# Patient Record
Sex: MALE | Employment: STUDENT | URBAN - METROPOLITAN AREA
[De-identification: names, ages, dates, MRNs, and addresses within clinical notes are randomized per-mention and may not be internally consistent; named-entity substitution may affect disease eponyms.]

---

## 2022-08-10 DIAGNOSIS — Z02.5 SPORTS PHYSICAL: Primary | ICD-10-CM

## 2022-08-10 PROCEDURE — 93000 ELECTROCARDIOGRAM COMPLETE: CPT | Performed by: INTERNAL MEDICINE

## 2024-02-27 ENCOUNTER — HOSPITAL ENCOUNTER (OUTPATIENT)
Dept: PHYSICAL THERAPY | Age: 24
Setting detail: THERAPIES SERIES
Discharge: HOME | End: 2024-02-27
Payer: COMMERCIAL

## 2024-02-27 PROCEDURE — 97112 NEUROMUSCULAR REEDUCATION: CPT

## 2024-02-27 PROCEDURE — 97161 PT EVAL LOW COMPLEX 20 MIN: CPT

## 2024-02-27 PROCEDURE — 97140 MANUAL THERAPY 1/> REGIONS: CPT

## 2024-02-27 NOTE — FLOWSHEET NOTE
[facility] - [facility] and [facility] Rehabilitation[address] [address]  Phone: [phone] Fax: [phone]    Physical Therapy Treatment Note/ Progress Report:       Date:  2024    Patient Name:  Rolando Torres    :  2000  MRN: [id number]  Restrictions/Precautions:    Medical/Treatment Diagnosis Information:  Diagnosis: concussion S06.0X0A, neck pain M54.2  Treatment Diagnosis: concussion S06.0X0A, neck pain M54.2  Insurance/Certification information:  PT Insurance Information: Aetna/AG/[place]  Physician Information:  Lewis Skinner, Jalil  Plan of care signed (Y/N):     Date of Patient follow up with Physician:      Progress Report: [x]  Yes  []  No     Date Range for reporting period:  Beginnin2024  Ending:     Progress report due (10 Rx/or 30 days whichever is less): 3/27/2024     Recertification due (POC duration/ or 90 days whichever is less): 3/27/2024     Visit # Insurance Allowable Auth Needed   1 Aetna/AG/[place] []Yes    []No     Pain level:  4-7/10     SUBJECTIVE:  See eval    OBJECTIVE: See eval  Observation:   Test measurements:      RESTRICTIONS/PRECAUTIONS: n/a    INTERVENTIONS:      Therapeutic Ex Sets/sec Reps Notes   UBE      T- band Row/pinch      T- band lower pinch      T- band ER activation      UT stretch      Levator stretch      Isometric at wall      Quadruped w cerv retract      Front plank      Side plank      Chin tuck      Chin tuck w lift      Chin tuck w rotation                  Manual Intervention 22      Cerv mobs/manip  5    Thoracic mobs/manip  3    CT manip  3    Rib mobilizations  3 R 1st rib   STM  8    DN            NMR re-education 10      JANEL assessment  5    Oculomotor assessment pre-post treatment, with education for home program to include smooth pursuits and gaze stabs  5                                Therapeutic Exercise and NMR EXR  [x] (79590) Provided verbal/tactile cueing for activities related to

## 2024-02-27 NOTE — PLAN OF CARE
[facility] Rehabilitation[place] [address] [place] [address]  Phone: [phone]  Fax:     [phone]         Physical Therapy Certification    Dear Lewis Skinner, *,    We had the pleasure of evaluating the following patient for physical therapy services at [facility].  A summary of our findings can be found in the initial assessment below.  This includes our plan of care.  If you have any questions or concerns regarding these findings, please do not hesitate to contact me at the office phone number checked above.  Thank you for the referral.       Physician Signature:_______________________________Date:__________________  By signing above (or electronic signature), therapist’s plan is approved by physician            Patient: Rolando Torres \"WILL\"  : 2000   MRN: [id number]  Referring Physician: Lewis Skinner, *      Evaluation Date: 2024      Medical Diagnosis Information:  Diagnosis: concussion S06.0X0A, neck pain M54.2   Treatment Diagnosis: concussion S06.0X0A, neck pain M54.2                                         Insurance information: PT Insurance Information: Aetna/AG/[place]    Precautions/ Contra-indications: n/a  Latex Allergy:  [x]NO      []YES  Preferred Language for Healthcare:   [x]English       []other:    C-SSRS Triggered by Intake questionnaire (Past 2 wk assessment ):   [x] No, Questionnaire did not trigger screening.   [] Yes, Patient intake triggered C-SSRS Screening      [] C-SSRS Screening completed  [] PCP notified via Epic     SUBJECTIVE: Patient stated complaint:Patient reports that about 5 weeks ago during a game he was skating up ice and lost his edge of the skate and the opposing player hit him in the head with his knees. Head did not contact ice. He ended up playing for additional 20 minutes but then couldn't play any more. Had been working

## 2024-02-29 ENCOUNTER — HOSPITAL ENCOUNTER (OUTPATIENT)
Dept: PHYSICAL THERAPY | Age: 24
Setting detail: THERAPIES SERIES
Discharge: HOME | End: 2024-02-29
Payer: COMMERCIAL

## 2024-02-29 PROCEDURE — 97032 APPL MODALITY 1+ESTIM EA 15: CPT

## 2024-02-29 PROCEDURE — 20560 NDL INSJ W/O NJX 1 OR 2 MUSC: CPT

## 2024-02-29 PROCEDURE — 97112 NEUROMUSCULAR REEDUCATION: CPT

## 2024-02-29 PROCEDURE — 97140 MANUAL THERAPY 1/> REGIONS: CPT

## 2024-02-29 NOTE — FLOWSHEET NOTE
[facility] and Sports Rehabilitation[place] [address] [place] [address]  Phone: [phone] Fax: [phone]    Physical Therapy Treatment Note/ Progress Report:       Date:  2024    Patient Name:  Rolando Torres   \"WILL\"  :  2000  MRN: [id number]  Restrictions/Precautions:    Medical/Treatment Diagnosis Information:  Diagnosis: concussion S06.0X0A, neck pain M54.2  Treatment Diagnosis: concussion S06.0X0A, neck pain M54.2  Insurance/Certification information:  PT Insurance Information: Aetna/[place]  Physician Information:  Lewis Skinner, *  Plan of care signed (Y/N):     Date of Patient follow up with Physician:      Progress Report: [x]  Yes  []  No     Date Range for reporting period:  Beginnin2024  Ending:     Progress report due (10 Rx/or 30 days whichever is less): 3/27/2024     Recertification due (POC duration/ or 90 days whichever is less): 3/27/2024     Visit # Insurance Allowable Auth Needed   2 Aetna/AG/[place] []Yes    []No     Pain level:  2-3/10     SUBJECTIVE:  States that he had the best day that he has in a few weeks after last session. HA has decreased. Able to study/concentrate for 2 hours (was about 40-50 min before). Agreeable to DN.      OBJECTIVE: See eval  Observation:   Test measurements:      RESTRICTIONS/PRECAUTIONS: n/a    INTERVENTIONS:      Therapeutic Ex 5 Sets/sec Reps Notes   UBE      T- band Row/pinch      T- band lower pinch      T- band ER activation      UT stretch      Levator stretch      Isometric at wall      Quadruped w cerv retract      Front plank      Side plank      Chin tuck      Chin tuck w lift      Chin tuck w rotation      No money 2 10 green   T spine extension over roll 10 10    Manual Intervention 26      Cerv mobs/manip  6    Thoracic mobs/manip  3    CT manip- seated and prone  6    Rib mobilizations  3 R 1st rib   STM- UTs  8                NMR re-education 15      JANEL assessment  0

## 2024-03-01 ENCOUNTER — HOSPITAL ENCOUNTER (OUTPATIENT)
Dept: PHYSICAL THERAPY | Age: 24
Setting detail: THERAPIES SERIES
Discharge: HOME | End: 2024-03-01
Payer: COMMERCIAL

## 2024-03-01 PROCEDURE — 97140 MANUAL THERAPY 1/> REGIONS: CPT

## 2024-03-01 PROCEDURE — 97110 THERAPEUTIC EXERCISES: CPT

## 2024-03-01 PROCEDURE — 97112 NEUROMUSCULAR REEDUCATION: CPT

## 2024-03-01 NOTE — FLOWSHEET NOTE
allow for proper joint functioning as indicated by patients Functional Deficits.   [] Progressing: [] Met: [] Not Met: [] Adjusted  3. Patient will demonstrate an increase in postural awareness and control and activation of  Deep cervical stabilizers to allow for proper functional mobility as indicated by patients Functional Deficits.   [] Progressing: [] Met: [] Not Met: [] Adjusted  4. Patient will return to working out and daily functional activities without increased symptoms or restriction.   [] Progressing: [] Met: [] Not Met: [] Adjusted  5. Patient to have normalized concussion symptoms to facilitate participation in movement, function, ADLs, as well as to allow patient to resume all school work without limitation.   [] Progressing: [] Met: [] Not Met: [] Adjusted   6. Patient will be able to skate at least 1 hour at practice without increase in symptoms. (patient specific functional goal)    [] Progressing: [] Met: [] Not Met: [] Adjusted    ASSESSMENT:  Mild increase in symptoms of HA today upon arrival; good tolerance to manipulation and soft tissue work with reports of decreased HA. Added further oculomotor and periscapular/cervical strengthening. Patient with no HA at conclusion.     Treatment/Activity Tolerance:  [x] Patient tolerated treatment well [] Patient limited by fatique  [] Patient limited by pain  [] Patient limited by other medical complications  [] Other:     Overall Progression Towards Functional goals/ Treatment Progress Update:  [] Patient is progressing as expected towards functional goals listed.    [] Progression is slowed due to complexities/Impairments listed.  [] Progression has been slowed due to co-morbidities.  [x] Plan just implemented, too soon to assess goals progression <30days   [] Goals require adjustment due to lack of progress  [] Patient is not progressing as expected and requires additional follow up with physician  [] Other    Prognosis for POC: [x] Good [] Fair  []

## 2024-03-04 ENCOUNTER — HOSPITAL ENCOUNTER (OUTPATIENT)
Dept: PHYSICAL THERAPY | Age: 24
Setting detail: THERAPIES SERIES
Discharge: HOME | End: 2024-03-04
Payer: COMMERCIAL

## 2024-03-04 PROCEDURE — 97140 MANUAL THERAPY 1/> REGIONS: CPT

## 2024-03-04 PROCEDURE — 97110 THERAPEUTIC EXERCISES: CPT

## 2024-03-04 NOTE — FLOWSHEET NOTE
[facility] - Orthopaedics and Sports Rehabilitation[address] [address]  Phone: [phone] Fax: [phone]    Physical Therapy Treatment Note/ Progress Report:       Date:  2024    Patient Name:  Rolando Torres   \"WILL\"  :  2000  MRN: [id number]  Restrictions/Precautions:    Medical/Treatment Diagnosis Information:  Diagnosis: concussion S06.0X0A, neck pain M54.2  Treatment Diagnosis: concussion S06.0X0A, neck pain M54.2  Insurance/Certification information:  PT Insurance Information: Aetna/[place]  Physician Information:  Lewis Skinner, *  Plan of care signed (Y/N):     Date of Patient follow up with Physician:      Progress Report: [x]  Yes  []  No     Date Range for reporting period:  Beginnin2024  Ending:     Progress report due (10 Rx/or 30 days whichever is less): 3/27/2024     Recertification due (POC duration/ or 90 days whichever is less): 3/27/2024     Visit # Insurance Allowable Auth Needed   4 Aetna/AG/[place] []Yes    []No     Pain level:  3-4/10     SUBJECTIVE: Patient reports that he felt good after last session on Friday. Went to game on Friday and Saturday. States that he had some mild symptoms after Friday game and then was ok at Saturday game but had to look to L for extended period of time. States that  he felt way worse and had increased HA on L (normally R), increased light sensitivity and mild dizziness.  Dr Ham did look at small cyst/lipoma area and rotation of C7/T1 and believes that is just his normal- not doing any further imaging at this time.      OBJECTIVE: c/spine flexion 65, extension 75  Observation: mild rotation to L of C7/T1, small cyst/lipoma type nodule at this level on L  Increased tightness bilaterally in c2 and c3 today  Test measurements:      RESTRICTIONS/PRECAUTIONS: n/a    INTERVENTIONS:      Therapeutic Ex 10 Sets/sec Reps Notes   UBE      Quadruped w cerv retract         Iron neck with pull

## 2024-03-05 ENCOUNTER — HOSPITAL ENCOUNTER (OUTPATIENT)
Dept: PHYSICAL THERAPY | Age: 24
Setting detail: THERAPIES SERIES
Discharge: HOME | End: 2024-03-05
Payer: COMMERCIAL

## 2024-03-05 PROCEDURE — 97110 THERAPEUTIC EXERCISES: CPT

## 2024-03-05 PROCEDURE — 97140 MANUAL THERAPY 1/> REGIONS: CPT

## 2024-03-05 NOTE — FLOWSHEET NOTE
[facility] - Orthopaedics and Sports Rehabilitation[address] [address]  Phone: [phone] Fax: [phone]    Physical Therapy Treatment Note/ Progress Report:       Date:  2024    Patient Name:  Rolando Torres   \"WILL\"  :  2000  MRN: [id number]  Restrictions/Precautions:    Medical/Treatment Diagnosis Information:  Diagnosis: concussion S06.0X0A, neck pain M54.2  Treatment Diagnosis: concussion S06.0X0A, neck pain M54.2  Insurance/Certification information:  PT Insurance Information: Aetna/[place]  Physician Information:  Lewis Skinner, *  Plan of care signed (Y/N):     Date of Patient follow up with Physician:      Progress Report: [x]  Yes  []  No     Date Range for reporting period:  Beginnin2024  Ending:     Progress report due (10 Rx/or 30 days whichever is less): 3/27/2024     Recertification due (POC duration/ or 90 days whichever is less): 3/27/2024     Visit # Insurance Allowable Auth Needed   5 Aetna/AG/[place] []Yes    []No     Pain level:  2/10     SUBJECTIVE: Patient reports that he has a slight HA today, better than yesterday. Is still feeling like eyes are disconnected and more foggy/dizzy. Saw Dr Ham and considering him to start medication for HA. Also discussing going to neuro-ophthalmologist for his eyes.        OBJECTIVE: c/spine flexion 65, extension 75  Observation: mild rotation to L of C7/T1, small cyst/lipoma type nodule at this level on L  Increased tightness bilaterally in c2 and c3 today  Test measurements:      RESTRICTIONS/PRECAUTIONS: n/a    INTERVENTIONS:      Therapeutic Ex 15 Sets/sec Reps Notes   UBE      Quadruped w cerv retract      Iron neck step outs in all directions 5sec 10    Iron neck with pull down 2 10 green   green   green      Cervical retraction + no money 2 10    green   T spine extension over roll 10 10    Manual Intervention 29      Cerv mobs/manip  6 Multi level   Thoracic mobs/manip  3    CT

## 2024-03-06 ENCOUNTER — HOSPITAL ENCOUNTER (OUTPATIENT)
Dept: PHYSICAL THERAPY | Age: 24
Setting detail: THERAPIES SERIES
Discharge: HOME | End: 2024-03-06
Payer: COMMERCIAL

## 2024-03-06 PROCEDURE — 97140 MANUAL THERAPY 1/> REGIONS: CPT

## 2024-03-06 PROCEDURE — 20560 NDL INSJ W/O NJX 1 OR 2 MUSC: CPT

## 2024-03-06 PROCEDURE — 97110 THERAPEUTIC EXERCISES: CPT

## 2024-03-06 PROCEDURE — 97032 APPL MODALITY 1+ESTIM EA 15: CPT

## 2024-03-06 NOTE — FLOWSHEET NOTE
[facility] - Orthopaedics and Sports Rehabilitation[address] [address]  Phone: [phone] Fax: [phone]    Physical Therapy Treatment Note/ Progress Report:       Date:  2024    Patient Name:  Rolando Torres   \"WILL\"  :  2000  MRN: [id number]  Restrictions/Precautions:    Medical/Treatment Diagnosis Information:  Diagnosis: concussion S06.0X0A, neck pain M54.2  Treatment Diagnosis: concussion S06.0X0A, neck pain M54.2  Insurance/Certification information:  PT Insurance Information: Aetna/[place]  Physician Information:  Lewis Skinner, *  Plan of care signed (Y/N):     Date of Patient follow up with Physician:      Progress Report: [x]  Yes  []  No     Date Range for reporting period:  Beginnin2024  Ending:     Progress report due (10 Rx/or 30 days whichever is less): 3/27/2024     Recertification due (POC duration/ or 90 days whichever is less): 3/27/2024     Visit # Insurance Allowable Auth Needed   6 Aetna/AG/[place] []Yes    []No     Pain level:  2/10     SUBJECTIVE: Patient reports that he has a slight HA today, better than yesterday. Less foggy/dizzy today compared to yesterday. Slowed down his oculomotor exercises with improved tolerance today.         OBJECTIVE: c/spine flexion 65, extension 75  Observation: mild rotation to L of C7/T1, small cyst/lipoma type nodule at this level on L  Increased tightness bilaterally in c2 and c3 today  Test measurements:      RESTRICTIONS/PRECAUTIONS: n/a    INTERVENTIONS:      Therapeutic Ex 20 Sets/sec Reps Notes   UBE      Quadruped w cerv retract      Iron neck step outs in all directions 5sec 10    Iron neck with pull down 2 10 green   Foam roll up wall with scap activation 1 10 green   Lateral wall taps 2 10 green      Cervical retraction + no money 2 10    No money 2 10 green   T spine extension over roll 10 10    Manual Intervention 29      Cerv mobs/manip  6 Multi level   Thoracic mobs/manip

## 2024-03-08 ENCOUNTER — HOSPITAL ENCOUNTER (OUTPATIENT)
Dept: PHYSICAL THERAPY | Age: 24
Setting detail: THERAPIES SERIES
Discharge: HOME | End: 2024-03-08
Payer: COMMERCIAL

## 2024-03-08 PROCEDURE — 97140 MANUAL THERAPY 1/> REGIONS: CPT

## 2024-03-08 NOTE — FLOWSHEET NOTE
[facility] - [facility] Rehabilitation[place] [facility] [address] [place] [address]  Phone: [phone] Fax: [phone]    Physical Therapy Treatment Note/ Progress Report:       Date:  2024    Patient Name:  Rolando Torres   \"WILL\"  :  2000  MRN: [id number]  Restrictions/Precautions:    Medical/Treatment Diagnosis Information:  Diagnosis: concussion S06.0X0A, neck pain M54.2  Treatment Diagnosis: concussion S06.0X0A, neck pain M54.2  Insurance/Certification information:  PT Insurance Information: Aetna/[place]  Physician Information:  Lewis Skinner, *  Plan of care signed (Y/N):     Date of Patient follow up with Physician:      Progress Report: [x]  Yes  []  No     Date Range for reporting period:  Beginnin2024  Ending:     Progress report due (10 Rx/or 30 days whichever is less): 3/27/2024     Recertification due (POC duration/ or 90 days whichever is less): 3/27/2024     Visit # Insurance Allowable Auth Needed   7 Aetna/AG/[place] []Yes    []No     Pain level:  2/10     SUBJECTIVE: Walking to class seems to be most aggravating. Did start meds and it seems to be helping a little. Still R sided HA, but moves.     OBJECTIVE: c/spine flexion 65, extension 75  Observation: mild rotation to L of C7/T1, small cyst/lipoma type nodule at this level on L  Increased tightness bilaterally in c2 and c3 today  Test measurements:      RESTRICTIONS/PRECAUTIONS: n/a    INTERVENTIONS:      Therapeutic Ex hold Sets/sec Reps Notes   UBE      Quadruped w cerv retract      Iron neck step outs in all directions 5sec 10    Iron neck with pull down 2 10 green   Foam roll up wall with scap activation 1 10 green   Lateral wall taps 2 10 green      Cervical retraction + no money 2 10    No money 2 10 green   T spine extension over roll 10 10    Manual Intervention 32      Cerv mobs/manip  6 Multi level   Thoracic mobs/manip  2    CT manip- seated and prone  3    Rib mobilizations  3

## 2024-03-11 ENCOUNTER — HOSPITAL ENCOUNTER (OUTPATIENT)
Dept: PHYSICAL THERAPY | Age: 24
Setting detail: THERAPIES SERIES
Discharge: HOME | End: 2024-03-11
Payer: COMMERCIAL

## 2024-03-11 PROCEDURE — 97110 THERAPEUTIC EXERCISES: CPT

## 2024-03-11 PROCEDURE — 97140 MANUAL THERAPY 1/> REGIONS: CPT

## 2024-03-11 NOTE — FLOWSHEET NOTE
Provided manual therapy to mobilize soft tissue/joints of cervical/CT, scapular GHJ and UE for the purpose of decreasing headache, modulating pain, promoting relaxation,  increasing ROM, reducing/eliminating soft tissue swelling/inflammation/restriction, improving soft tissue extensibility and allowing for proper ROM for normal function with self care, reaching, carrying, lifting, house/yardwork, driving/computer work    Modalities:      Charges:  Timed Code Treatment Minutes: 45   Total Treatment Minutes: 45     [] EVAL (LOW) 48009 (typically 20 minutes face-to-face)  [] EVAL (MOD) 63945 (typically 30 minutes face-to-face)  [] EVAL (HIGH) 98612 (typically 45 minutes face-to-face)  [] RE-EVAL     [x] TE(36862) x  1   [] Dry needle 1 or 2 Muscles (20561)  [] NMR (70024) x     [] Dry needle 3+ Muscles (20562)  [x] Manual (90619) x  2    [] TA (08346) x     [] Mech Traction (62906)  [] ES(attended) (40437)     [] ES (un) (92980):   [] VASO (03931)  [] Other    GOALS:  Patient stated goal: relieve tension in neck  [] Progressing: [] Met: [] Not Met: [] Adjusted    Therapist goals for Patient:   Short Term Goals: To be achieved in: 2 weeks  1. Independent in HEP and progression per patient tolerance, in order to prevent re-injury.   [] Progressing: [] Met: [] Not Met: [] Adjusted  2. Patient will have a decrease in pain and concussion symptoms to facilitate improvement in movement, function, and ADLs as indicated by Functional Deficits.  [] Progressing: [] Met: [] Not Met: [] Adjusted    Long Term Goals: To be achieved in: 4 weeks  1. Disability index score of 5% or less for the NDI and less than 10% for DHI to assist with reaching prior level of function.   [] Progressing: [] Met: [] Not Met: [] Adjusted  2. Patient will demonstrate increased AROM to WFL of cervical/thoracic spine to allow for proper joint functioning as indicated by patients Functional Deficits.   [] Progressing: [] Met: [] Not Met: [] Adjusted  3.

## 2024-03-13 ENCOUNTER — HOSPITAL ENCOUNTER (OUTPATIENT)
Dept: PHYSICAL THERAPY | Age: 24
Setting detail: THERAPIES SERIES
Discharge: HOME | End: 2024-03-13
Payer: COMMERCIAL

## 2024-03-13 PROCEDURE — 97110 THERAPEUTIC EXERCISES: CPT

## 2024-03-13 PROCEDURE — 97140 MANUAL THERAPY 1/> REGIONS: CPT

## 2024-03-13 NOTE — FLOWSHEET NOTE
progressing as expected towards functional goals listed.    [] Progression is slowed due to complexities/Impairments listed.  [] Progression has been slowed due to co-morbidities.  [x] Plan just implemented, too soon to assess goals progression <30days   [] Goals require adjustment due to lack of progress  [] Patient is not progressing as expected and requires additional follow up with physician  [] Other    Prognosis for POC: [x] Good [] Fair  [] Poor    Patient requires continued skilled intervention: [x] Yes  [] No        PLAN: Continue to address oculomotor and cervical symptoms  [x] Continue per plan of care [] Alter current plan (see comments)  [] Plan of care initiated [] Hold pending MD visit [] Discharge    Electronically signed by: Meenu Maynard, PT    Note: If patient does not return for scheduled/recommended follow up visits, this note will serve as a discharge from care along with the most recent update on progress.

## 2024-03-15 ENCOUNTER — APPOINTMENT (OUTPATIENT)
Dept: PHYSICAL THERAPY | Age: 24
End: 2024-03-15
Payer: COMMERCIAL

## 2024-03-19 ENCOUNTER — HOSPITAL ENCOUNTER (OUTPATIENT)
Dept: PHYSICAL THERAPY | Age: 24
Setting detail: THERAPIES SERIES
Discharge: HOME | End: 2024-03-19
Payer: COMMERCIAL

## 2024-03-19 PROCEDURE — 97110 THERAPEUTIC EXERCISES: CPT

## 2024-03-19 PROCEDURE — 97140 MANUAL THERAPY 1/> REGIONS: CPT

## 2024-03-19 NOTE — PLAN OF CARE
to prevent upper trap compensation and excessive forward shoulders or over compensation from c/spine or l/spine due to decrease in thoracic extension. Still struggling with maintaining focus/headaches while in class but overall has improved. Needs to further work on oculomotor exercises, with his new glasses- which will begin at next session. Will continue to address noted deficits 2-3x week to further improve. Additionally, patient to begin returning to cardio work- biking, elliptical or skating. Discussed this expected progression with patient.            Treatment/Activity Tolerance:  [x] Patient tolerated treatment well [] Patient limited by fatique  [] Patient limited by pain  [] Patient limited by other medical complications  [] Other:     Overall Progression Towards Functional goals/ Treatment Progress Update:  [x] Patient is progressing as expected towards functional goals listed.    [] Progression is slowed due to complexities/Impairments listed.  [] Progression has been slowed due to co-morbidities.  [] Plan just implemented, too soon to assess goals progression <30days   [] Goals require adjustment due to lack of progress  [] Patient is not progressing as expected and requires additional follow up with physician  [] Other    Prognosis for POC: [x] Good [] Fair  [] Poor    Patient requires continued skilled intervention: [x] Yes  [] No        PLAN: Continue to address oculomotor and cervical symptoms  [x] Continue per plan of care [] Alter current plan (see comments)  [] Plan of care initiated [] Hold pending MD visit [] Discharge    Electronically signed by: Maria Eugenia Sher PT    Note: If patient does not return for scheduled/recommended follow up visits, this note will serve as a discharge from care along with the most recent update on progress.

## 2024-03-20 ENCOUNTER — HOSPITAL ENCOUNTER (OUTPATIENT)
Dept: PHYSICAL THERAPY | Age: 24
Setting detail: THERAPIES SERIES
Discharge: HOME | End: 2024-03-20
Payer: COMMERCIAL

## 2024-03-20 PROCEDURE — 97140 MANUAL THERAPY 1/> REGIONS: CPT

## 2024-03-20 PROCEDURE — 97112 NEUROMUSCULAR REEDUCATION: CPT

## 2024-03-20 PROCEDURE — 97032 APPL MODALITY 1+ESTIM EA 15: CPT

## 2024-03-20 PROCEDURE — 20560 NDL INSJ W/O NJX 1 OR 2 MUSC: CPT

## 2024-03-20 NOTE — FLOWSHEET NOTE
[facility] - Orthopaedics and Sports Rehabilitation[address] [address]  Phone: [phone] Fax: [phone]        Physical Therapy Treatment Note/ Progress Report:       Date:  2024    Patient Name:  Rolando Torres   \"WILL\"  :  2000  MRN: [id number]  Restrictions/Precautions:    Medical/Treatment Diagnosis Information:  Diagnosis: concussion S06.0X0A, neck pain M54.2  Treatment Diagnosis: concussion S06.0X0A, neck pain M54.2  Insurance/Certification information:  PT Insurance Information: Aetna/[place]  Physician Information:  Lewis Skinner, *  Plan of care signed (Y/N):     Date of Patient follow up with Physician:      Progress Report: [x]  Yes  []  No     Date Range for reporting period:  Beginnin2024  Ending: 3/19/2024    Progress report due (10 Rx/or 30 days whichever is less): 3/27/2024     Recertification due (POC duration/ or 90 days whichever is less): 3/27/2024     Visit # Insurance Allowable Auth Needed   11 Aetna/AG/[place] []Yes    []No     Pain level:  3-4/10     SUBJECTIVE: Pt states his neck has been feeling a bit sore. Would like to DN again today. Glasses have been helping some. Did biking for about 5 min today and this increased HA some.     OBJECTIVE:   Observation: mild rotation to L of C7/T1, small cyst/lipoma type nodule at this level on L  Increased tightness bilaterally in c2 and c3 today  Test measurements:      DHI 10%  NDI 8%    CERV ROM     Cervical Flexion 70   Cervical Extension 75   Cervical SB L 50; R 45    Cervical rotation L 87 ; R 87     VOMS: Slowed horizontal saccades, everything else WNL    RESTRICTIONS/PRECAUTIONS: n/a    INTERVENTIONS:      Therapeutic Ex 0 Sets/sec Reps Notes   UBE      Quadruped w cerv retract 5 sec 10 red   Iron neck retro step with turning- bilateral turns 5sec 10    Iron neck with RIP stick 1 10    2 10 green   Foam roll up wall with scap activation 1 10 green   Lateral wall taps 2 10

## 2024-03-22 ENCOUNTER — HOSPITAL ENCOUNTER (OUTPATIENT)
Dept: PHYSICAL THERAPY | Age: 24
Setting detail: THERAPIES SERIES
Discharge: HOME | End: 2024-03-22
Payer: COMMERCIAL

## 2024-03-22 PROCEDURE — 97110 THERAPEUTIC EXERCISES: CPT

## 2024-03-22 PROCEDURE — 97140 MANUAL THERAPY 1/> REGIONS: CPT

## 2024-03-22 NOTE — FLOWSHEET NOTE
[facility] - [facility]opaedics and Sports Rehabilitation[address] [address]  Phone: [phone] Fax: [phone]        Physical Therapy Treatment Note/ Progress Report:       Date:  2024    Patient Name:  Rolando Torres   \"WILL\"  :  2000  MRN: [id number]  Restrictions/Precautions:    Medical/Treatment Diagnosis Information:  Diagnosis: concussion S06.0X0A, neck pain M54.2  Treatment Diagnosis: concussion S06.0X0A, neck pain M54.2  Insurance/Certification information:  PT Insurance Information: Aetna/AG/[place]  Physician Information:  Lewis Skinner, *  Plan of care signed (Y/N):     Date of Patient follow up with Physician:      Progress Report: [x]  Yes  []  No     Date Range for reporting period:  Beginnin2024  Ending: 3/19/2024    Progress report due (10 Rx/or 30 days whichever is less): 3/27/2024     Recertification due (POC duration/ or 90 days whichever is less): 3/27/2024     Visit # Insurance Allowable Auth Needed   12 Aetna/AG/[place] []Yes    []No     Pain level:  1-2/10     SUBJECTIVE: Pt states his neck was sore after last session; but feeling better after DN. Has minimal HA today. No issues with oculomotor exercises after last session.        OBJECTIVE:   Observation: mild rotation to L of C7/T1, small cyst/lipoma type nodule at this level on L  Increased tightness bilaterally in c2 and c3 today  Test measurements:      DHI 10%  NDI 8%    CERV ROM     Cervical Flexion 70   Cervical Extension 75   Cervical SB L 50; R 45    Cervical rotation L 87 ; R 87     VOMS: Slowed horizontal saccades, everything else WNL    RESTRICTIONS/PRECAUTIONS: n/a    INTERVENTIONS:      Therapeutic Ex 20 Sets/sec Reps Notes   UBE      Quadruped w cerv retract 5 sec 10 red   Iron neck retro step with turning- bilateral turns 5sec 10    Iron neck with RIP stick 1 10    2 10 green   Foam roll up wall with scap activation 1 10 green   Lateral wall taps 2 10 green

## 2024-04-02 ENCOUNTER — HOSPITAL ENCOUNTER (OUTPATIENT)
Dept: PHYSICAL THERAPY | Age: 24
Setting detail: THERAPIES SERIES
Discharge: HOME | End: 2024-04-02
Payer: COMMERCIAL

## 2024-04-02 PROCEDURE — 97110 THERAPEUTIC EXERCISES: CPT

## 2024-04-02 PROCEDURE — 97140 MANUAL THERAPY 1/> REGIONS: CPT

## 2024-04-02 NOTE — FLOWSHEET NOTE
[facility] - Orthopaedics and Sports Rehabilitation[address] [address]  Phone: [phone] Fax: [phone]        Physical Therapy Treatment Note/ Progress Report:       Date:  2024    Patient Name:  Rolando Torres   \"WILL\"  :  2000  MRN: [id number]  Restrictions/Precautions:    Medical/Treatment Diagnosis Information:  Diagnosis: concussion S06.0X0A, neck pain M54.2  Treatment Diagnosis: concussion S06.0X0A, neck pain M54.2  Insurance/Certification information:  PT Insurance Information: Aetna/[place]  Physician Information:  Lewis Skinner, [place]  Plan of care signed (Y/N):     Date of Patient follow up with Physician:      Progress Report: [x]  Yes  []  No     Date Range for reporting period:  Beginnin2024  Ending: 3/19/2024    Progress report due (10 Rx/or 30 days whichever is less): 3/27/2024     Recertification due (POC duration/ or 90 days whichever is less): 3/27/2024     Visit # Insurance Allowable Auth Needed   13 Aetna/AG/[place] []Yes    []No     Pain level:  3/10 (headache)     SUBJECTIVE: Pt reports currently having a headache on R side that feels like it is deep. Was sick all day  which he felt like may have contributed d/t increased pressure. He states his headaches were not too bad over spring break. He skated and worked on stick handling without much issue. Feels like he doesn't know what is causing his headaches. He went to [place] and walked around and went to hockey game without issue. Had a headache after studying for hours on . Is going to get an MRI tomorrow.        OBJECTIVE:   Observation: mild rotation to L of C7/T1, small cyst/lipoma type nodule at this level on L  Increased tightness bilaterally in c2 and c3 today  Test measurements:      DHI 10%  NDI 8%    CERV ROM     Cervical Flexion 70   Cervical Extension 75   Cervical SB L 50; R 45    Cervical rotation L 87 ; R 87     VOMS: Slowed horizontal saccades,

## 2024-04-03 ENCOUNTER — APPOINTMENT (OUTPATIENT)
Dept: PHYSICAL THERAPY | Age: 24
End: 2024-04-03
Payer: COMMERCIAL

## 2024-04-05 ENCOUNTER — HOSPITAL ENCOUNTER (OUTPATIENT)
Dept: PHYSICAL THERAPY | Age: 24
Setting detail: THERAPIES SERIES
Discharge: HOME | End: 2024-04-05
Payer: COMMERCIAL

## 2024-04-05 PROCEDURE — 97140 MANUAL THERAPY 1/> REGIONS: CPT

## 2024-04-05 PROCEDURE — 97110 THERAPEUTIC EXERCISES: CPT

## 2024-04-05 NOTE — FLOWSHEET NOTE
[facility] - Orthopaedics and Sports Rehabilitation[address] [address]  Phone: [phone] Fax: [phone]        Physical Therapy Treatment Note/ Progress Report:       Date:  2024    Patient Name:  Rolando Torres   \"WILL\"  :  2000  MRN: [id number]  Restrictions/Precautions:    Medical/Treatment Diagnosis Information:  Diagnosis: concussion S06.0X0A, neck pain M54.2  Treatment Diagnosis: concussion S06.0X0A, neck pain M54.2  Insurance/Certification information:  PT Insurance Information: Aetna/[place]  Physician Information:  Lewis Skinner, *  Plan of care signed (Y/N):     Date of Patient follow up with Physician:      Progress Report: [x]  Yes  []  No     Date Range for reporting period:  Beginnin2024  Ending: 3/19/2024    Progress report due (10 Rx/or 30 days whichever is less): 3/27/2024     Recertification due (POC duration/ or 90 days whichever is less): 3/27/2024     Visit # Insurance Allowable Auth Needed   14 Aetna/AG/[place] []Yes    []No     Pain level:  1-2/10 (headache)     SUBJECTIVE: Pt reports that his HA has been feeling better. Has a slight HA today. Notes that his neck gets stiff with doing a lot of studying. States that he was able to bike for 25 min today but last 2 mins started having a little bit of HA. Had some HA with listening to a lot of music.        OBJECTIVE:   Observation: mild rotation to L of C7/T1, small cyst/lipoma type nodule at this level on L  Increased tightness bilaterally in c2 and c3 today  Test measurements:      DHI 10%  NDI 8%    CERV ROM     Cervical Flexion 70   Cervical Extension 75   Cervical SB L 50; R 45    Cervical rotation L 87 ; R 87     VOMS: Slowed horizontal saccades, everything else WNL    RESTRICTIONS/PRECAUTIONS: n/a    INTERVENTIONS:      Therapeutic Ex 22 Sets/sec Reps Notes   UBE      Quadruped w cerv retract 5 sec 10 green   Iron neck retro step with turning- bilateral turns 5sec 10  Normal for race

## 2024-04-09 ENCOUNTER — HOSPITAL ENCOUNTER (OUTPATIENT)
Dept: PHYSICAL THERAPY | Age: 24
Setting detail: THERAPIES SERIES
Discharge: HOME | End: 2024-04-09
Payer: COMMERCIAL

## 2024-04-09 PROCEDURE — 97140 MANUAL THERAPY 1/> REGIONS: CPT

## 2024-04-09 PROCEDURE — 97110 THERAPEUTIC EXERCISES: CPT

## 2024-04-09 PROCEDURE — 97112 NEUROMUSCULAR REEDUCATION: CPT

## 2024-04-09 NOTE — FLOWSHEET NOTE
[x] Progressing: [] Met: [] Not Met: [] Adjusted    ASSESSMENT:  Increased tightness overall in bilat mid to low cervical spine.  Good improvement in overall joint mobility and with cavitation of c/spine. Focus on pain reduction, mobility and stretching today with good tolerance.  Patient with good reduction in HA at conclusion of session. Did well with progression of all exercises and oculomotor exercises today. Good overall tolerance to all exercises today.  Will continue to address noted deficits 2-3x week to further improve. Additionally, patient to begin returning to cardio work- biking, elliptical or skating.           Treatment/Activity Tolerance:  [x] Patient tolerated treatment well [] Patient limited by fatique  [] Patient limited by pain  [] Patient limited by other medical complications  [] Other:     Overall Progression Towards Functional goals/ Treatment Progress Update:  [x] Patient is progressing as expected towards functional goals listed.    [] Progression is slowed due to complexities/Impairments listed.  [] Progression has been slowed due to co-morbidities.  [] Plan just implemented, too soon to assess goals progression <30days   [] Goals require adjustment due to lack of progress  [] Patient is not progressing as expected and requires additional follow up with physician  [] Other    Prognosis for POC: [x] Good [] Fair  [] Poor    Patient requires continued skilled intervention: [x] Yes  [] No        PLAN: Continue to address oculomotor and cervical symptoms  [x] Continue per plan of care [] Alter current plan (see comments)  [] Plan of care initiated [] Hold pending MD visit [] Discharge    Electronically signed by: Maria Eugenia Sher PT    Note: If patient does not return for scheduled/recommended follow up visits, this note will serve as a discharge from care along with the most recent update on progress.

## 2024-04-10 ENCOUNTER — HOSPITAL ENCOUNTER (OUTPATIENT)
Dept: PHYSICAL THERAPY | Age: 24
Setting detail: THERAPIES SERIES
Discharge: HOME | End: 2024-04-10
Payer: COMMERCIAL

## 2024-04-10 PROCEDURE — 97140 MANUAL THERAPY 1/> REGIONS: CPT

## 2024-04-10 PROCEDURE — 97112 NEUROMUSCULAR REEDUCATION: CPT

## 2024-04-10 PROCEDURE — 97110 THERAPEUTIC EXERCISES: CPT

## 2024-04-10 NOTE — FLOWSHEET NOTE
[facility] - Orthopaedics and Sports Rehabilitation[address] [address]  Phone: [phone] Fax: [phone]        Physical Therapy Treatment Note/ Progress Report:       Date:  04/10/2024    Patient Name:  Rolando Torres   \"WILL\"  :  2000  MRN: [id number]  Restrictions/Precautions:    Medical/Treatment Diagnosis Information:  Diagnosis: concussion S06.0X0A, neck pain M54.2  Treatment Diagnosis: concussion S06.0X0A, neck pain M54.2  Insurance/Certification information:  PT Insurance Information: Aetna/[place]  Physician Information:  Lewis Skinner, *  Plan of care signed (Y/N):     Date of Patient follow up with Physician:      Progress Report: []  Yes  [x]  No     Date Range for reporting period:  Beginnin2024  Ending: 3/19/2024    Progress report due (10 Rx/or 30 days whichever is less): 3/27/2024     Recertification due (POC duration/ or 90 days whichever is less): 3/27/2024     Visit # Insurance Allowable Auth Needed   16 Aetna/AG/[place] []Yes    []No     Pain level:  .5/10 (headache)     SUBJECTIVE: Pt reports that he felt pretty good after yesterday session. Notes that he has very very mild HA today. Nothing much. Overall, neck and eyes are feeling better.         OBJECTIVE:   Observation: mild rotation to L of C7/T1, small cyst/lipoma type nodule at this level on L  Increased tightness bilaterally in c2 and c3 today  Test measurements:      DHI 10%  NDI 8%    CERV ROM     Cervical Flexion 70   Cervical Extension 75   Cervical SB L 50; R 45    Cervical rotation L 87 ; R 87     VOMS: Slowed horizontal saccades, everything else WNL    RESTRICTIONS/PRECAUTIONS: n/a    INTERVENTIONS:      Therapeutic Ex 15 Sets/sec Reps Notes   UBE      Quadruped w cerv retract 5 sec 10 green   Iron neck retro step with turning- bilateral turns 5sec 10    Quadruped w/ cerv retraction + OH shoulder elevation 3sec 10 3 lb      Iron neck with RIP stick 1 10    2 10

## 2024-04-12 ENCOUNTER — HOSPITAL ENCOUNTER (OUTPATIENT)
Dept: PHYSICAL THERAPY | Age: 24
Setting detail: THERAPIES SERIES
Discharge: HOME | End: 2024-04-12
Payer: COMMERCIAL

## 2024-04-12 PROCEDURE — 97140 MANUAL THERAPY 1/> REGIONS: CPT

## 2024-04-12 PROCEDURE — 97110 THERAPEUTIC EXERCISES: CPT

## 2024-04-12 PROCEDURE — 97112 NEUROMUSCULAR REEDUCATION: CPT

## 2024-04-12 NOTE — FLOWSHEET NOTE
5sec 10    Quadruped w/ cerv retraction + OH shoulder elevation 0  3 lb            2 10 green   green   No money 2 10 green   Lateral wall taps 2 10 green      Cervical retraction + no money 2 10 green   Seated first rib stretch 20s 3 R   Seated scalene stretch 20s 3 R   T spine extension with strap + OH flexion at wall 10 10    Supine snow fela on foam roll   0 Stops 110-120 deg abduction 2/2 tightness   Prone T/Y/I on slider  10/ea Tried over cones and over compensation from c/spine   Prone c/spine retraction, scap activation and sh extension 4 lb 10/ea Fatigues quickly   UT stretch 30s 3    Levator scap stretch 30s 3       Towel rotation stretch for joint mobilization 30s 3                      Manual Intervention 20      Cerv mobs/manip  8 Multi level   Thoracic mobs/manip  2 Mid-upper   CT manip- seated and  0    Rib mobilizations  0 R 1st rib   STM- cerv/UT  10    Suboccipital release/distraction  0    Upper c/spine manip- towel  0          NMR re-education 14            Gaze stabilization in standing vertical and horizontal with walking 0  Slowed movement   SLS on airex with stick handling on TM to work on oculomotor control 3 1' 2.5 mph TM, field hockey ball   Slide board with stick handling for oculomotor control- full distance- increased speed 3 2'    Figure 8 stick handling on floor 1 3'              Therapeutic Exercise and NMR EXR  [x] (89063) Provided verbal/tactile cueing for activities related to strengthening, flexibility, endurance, ROM  for improvements in cervical, postural, scapular, scapulothoracic and UE control with self care, reaching, carrying, lifting, house/yardwork, driving/computer work.    [x] (05031) Provided verbal/tactile cueing for activities related to improving balance, coordination, kinesthetic sense, posture, motor skill, proprioception  to assist with cervical, scapular, scapulothoracic and UE control with self care, reaching, carrying, lifting, house/yardwork,

## 2024-04-16 ENCOUNTER — HOSPITAL ENCOUNTER (OUTPATIENT)
Dept: PHYSICAL THERAPY | Age: 24
Setting detail: THERAPIES SERIES
Discharge: HOME | End: 2024-04-16
Payer: COMMERCIAL

## 2024-04-16 PROCEDURE — 97140 MANUAL THERAPY 1/> REGIONS: CPT

## 2024-04-16 PROCEDURE — 97110 THERAPEUTIC EXERCISES: CPT

## 2024-04-16 PROCEDURE — 97112 NEUROMUSCULAR REEDUCATION: CPT

## 2024-04-16 NOTE — FLOWSHEET NOTE
[facility] - Orthopaedics and Sports Rehabilitation[address] [address]  Phone: [phone] Fax: [phone]        Physical Therapy Treatment Note/ Progress Report:       Date:  2024    Patient Name:  Rolando Torres   \"WILL\"  :  2000  MRN: [id number]  Restrictions/Precautions:    Medical/Treatment Diagnosis Information:  Diagnosis: concussion S06.0X0A, neck pain M54.2  Treatment Diagnosis: concussion S06.0X0A, neck pain M54.2  Insurance/Certification information:  PT Insurance Information: Aetna/[place]  Physician Information:  Lewis Skinner, *  Plan of care signed (Y/N):     Date of Patient follow up with Physician:      Progress Report: []  Yes  [x]  No     Date Range for reporting period:  Beginnin2024  Ending: 3/19/2024    Progress report due (10 Rx/or 30 days whichever is less): 3/27/2024     Recertification due (POC duration/ or 90 days whichever is less): 3/27/2024     Visit # Insurance Allowable Auth Needed   18 Aetna/AG/[place] []Yes    []No     Pain level:  .5/10 (headache)     SUBJECTIVE: Pt reports that he felt pretty good after yesterday session. Did more with sprints on bike and didn't have any issue. Had a long weekend and did have some HA at the end of Saturday into , but was better later that day.      OBJECTIVE:   Observation: mild rotation to L of C7/T1, small cyst/lipoma type nodule at this level on L  Increased tightness bilaterally in c2 and c3 today  Test measurements:      DHI 10%  NDI 8%    CERV ROM     Cervical Flexion 70   Cervical Extension 75   Cervical SB L 50; R 45    Cervical rotation L 87 ; R 87     VOMS: Slowed horizontal saccades, everything else WNL    RESTRICTIONS/PRECAUTIONS: n/a    INTERVENTIONS:      Therapeutic Ex 15 Sets/sec Reps Notes   UBE      Quadruped w cerv retract 0  green   Iron neck with hockey stick- slap shot- slow control 1 10    Iron neck retro step with turning- bilateral turns 5sec 10

## 2024-04-18 ENCOUNTER — HOSPITAL ENCOUNTER (OUTPATIENT)
Dept: PHYSICAL THERAPY | Age: 24
Setting detail: THERAPIES SERIES
Discharge: HOME | End: 2024-04-18
Payer: COMMERCIAL

## 2024-04-18 PROCEDURE — 97140 MANUAL THERAPY 1/> REGIONS: CPT

## 2024-04-18 PROCEDURE — 97110 THERAPEUTIC EXERCISES: CPT

## 2024-04-18 PROCEDURE — 97112 NEUROMUSCULAR REEDUCATION: CPT

## 2024-04-18 NOTE — FLOWSHEET NOTE
[facility] - Orthopaedics and Sports Rehabilitation[address] [address]  Phone: [phone] Fax: [phone]        Physical Therapy Treatment Note/ Progress Report:       Date:  2024    Patient Name:  Rolando Torres   \"WILL\"  :  2000  MRN: [id number]  Restrictions/Precautions:    Medical/Treatment Diagnosis Information:  Diagnosis: concussion S06.0X0A, neck pain M54.2  Treatment Diagnosis: concussion S06.0X0A, neck pain M54.2  Insurance/Certification information:  PT Insurance Information: Aetna/[place]  Physician Information:  Lewis Skinner, *  Plan of care signed (Y/N):     Date of Patient follow up with Physician:      Progress Report: []  Yes  [x]  No     Date Range for reporting period:  Beginnin2024  Ending: 3/19/2024    Progress report due (10 Rx/or 30 days whichever is less): 3/27/2024     Recertification due (POC duration/ or 90 days whichever is less): 3/27/2024     Visit # Insurance Allowable Auth Needed   19 Aetna/AG/[place] []Yes    []No     Pain level:  1/10 (headache)     SUBJECTIVE: Pt reports that he felt pretty good after yesterday session. Did a lot more heavy lifting today more with sprints on bike and didn't have any issue. Had a long weekend and did have some HA at the end of Saturday into , but was better later that day.      OBJECTIVE:   Observation: mild rotation to L of C7/T1, small cyst/lipoma type nodule at this level on L  Increased tightness bilaterally in c2 and c3 today  Test measurements:      DHI 10%  NDI 8%    CERV ROM     Cervical Flexion 70   Cervical Extension 75   Cervical SB L 50; R 45    Cervical rotation L 87 ; R 87     VOMS: Slowed horizontal saccades, everything else WNL    RESTRICTIONS/PRECAUTIONS: n/a    INTERVENTIONS:      Therapeutic Ex 28 Sets/sec Reps Notes   UBE      Quadruped w cerv retract 1 10 green   Iron neck with hockey stick- slap shot- slow control 0     Iron neck retro step with

## 2024-04-23 ENCOUNTER — HOSPITAL ENCOUNTER (OUTPATIENT)
Dept: PHYSICAL THERAPY | Age: 24
Setting detail: THERAPIES SERIES
Discharge: HOME | End: 2024-04-23
Payer: COMMERCIAL

## 2024-04-23 PROCEDURE — 97140 MANUAL THERAPY 1/> REGIONS: CPT

## 2024-04-23 PROCEDURE — 97110 THERAPEUTIC EXERCISES: CPT

## 2024-04-23 PROCEDURE — 97112 NEUROMUSCULAR REEDUCATION: CPT

## 2024-04-23 NOTE — PLAN OF CARE
bilateral turns 0     Quadruped w/ cerv retraction + OH shoulder elevation 1 10 3 lb   Quadruped ball on wall + cervical retraction with green band 2 10/ea    SB plank with chin tuck 15' 3    Iron neck with pull down, I & T 1 10 Green, challenging   Foam roll up wall with scap activation 1 10 green   No money 2 10 green   Lateral wall taps 2 10 green      Cervical retraction + no money 2 10 green   Seated first rib stretch 20s 3 R   Seated scalene stretch 20s 3 R   T spine extension with strap + OH flexion at wall 10 10    Supine snow fela on foam roll   0 Stops 110-120 deg abduction 2/2 tightness   Prone T/Y/I on slider  10/ea Tried over cones and over compensation from c/spine   Prone c/spine retraction, scap activation and sh extension 3 lb 10/ea Fatigues quickly   Prone T 2 lb 10/ea Fatigues quickly   Prone scaption- low trap activation holds 5sec 5/ea Shaky and very fatigued   TRX with chin tuck 10 10    Swiss ball plank roll out + DKTC with DNF activation 2 8    Blue SC fwd press + DNF activation 2 10          Chin tuck & rotation for upper c/spine stretch 30s 3    Towel rotation stretch for joint mobilization 30s 3                      Manual Intervention 20      Cerv mobs/manip  8 Multi level   Thoracic mobs/manip  2 Mid-upper   CT manip- seated and  0    Rib mobilizations  0 R 1st rib   STM- cerv/UT  10    Suboccipital release/distraction  0    Upper c/spine manip- towel  0          NMR re-education 12            Gaze stabilization in standing vertical and horizontal with walking 0  Slowed movement   SLS on airex with stick handling on TM to work on oculomotor control 3 1' 2.5 mph TM, field hockey ball   Slide board with stick handling for oculomotor control- full distance- increased speed 3 2'    Figure 8 stick handling on floor 1 3'              Therapeutic Exercise and NMR EXR  [x] (79595) Provided verbal/tactile cueing for activities related to strengthening, flexibility, endurance, ROM  for

## 2024-04-26 ENCOUNTER — HOSPITAL ENCOUNTER (OUTPATIENT)
Dept: PHYSICAL THERAPY | Age: 24
Setting detail: THERAPIES SERIES
Discharge: HOME | End: 2024-04-26
Payer: COMMERCIAL

## 2024-04-26 PROCEDURE — 97140 MANUAL THERAPY 1/> REGIONS: CPT

## 2024-04-26 PROCEDURE — 97110 THERAPEUTIC EXERCISES: CPT

## 2024-04-26 NOTE — FLOWSHEET NOTE
normalized concussion symptoms to facilitate participation in movement, function, ADLs, as well as to allow patient to resume all school work without limitation.   [x] Progressing: [] Met: [] Not Met: [] Adjusted   6. Patient will be able to skate at least 1 hour at practice without increase in symptoms. (patient specific functional goal)    [x] Progressing: [] Met: [] Not Met: [] Adjusted    ASSESSMENT: Patient continues to make good improvements in overall cervical ROM, cervical DNF strength and activation and oculomotor exercises while working back into more sport specific exercises. Patient does still need cues for improved form and activation of DNF during more strenuous exercises. Struggles with all periscapular activation and needs tactile and verbal cues for improvement in overall form and decreased compensation in UT. Will continue to address deficits noted to allow pt to return to full sport on ice.       Treatment/Activity Tolerance:  [x] Patient tolerated treatment well [] Patient limited by fatique  [] Patient limited by pain  [] Patient limited by other medical complications  [] Other:     Overall Progression Towards Functional goals/ Treatment Progress Update:  [x] Patient is progressing as expected towards functional goals listed.    [] Progression is slowed due to complexities/Impairments listed.  [] Progression has been slowed due to co-morbidities.  [] Plan just implemented, too soon to assess goals progression <30days   [] Goals require adjustment due to lack of progress  [] Patient is not progressing as expected and requires additional follow up with physician  [] Other    Prognosis for POC: [x] Good [] Fair  [] Poor    Patient requires continued skilled intervention: [x] Yes  [] No        PLAN: Continue to address oculomotor and cervical symptoms  [x] Continue per plan of care [] Alter current plan (see comments)  [] Plan of care initiated [] Hold pending MD visit [] Discharge    Electronically

## 2024-04-30 ENCOUNTER — HOSPITAL ENCOUNTER (OUTPATIENT)
Dept: PHYSICAL THERAPY | Age: 24
Setting detail: THERAPIES SERIES
Discharge: HOME | End: 2024-04-30
Payer: COMMERCIAL

## 2024-04-30 PROCEDURE — 97140 MANUAL THERAPY 1/> REGIONS: CPT

## 2024-04-30 PROCEDURE — 97112 NEUROMUSCULAR REEDUCATION: CPT

## 2024-04-30 PROCEDURE — 97110 THERAPEUTIC EXERCISES: CPT

## 2024-04-30 NOTE — FLOWSHEET NOTE
Not Met: [] Adjusted  5. Patient to have normalized concussion symptoms to facilitate participation in movement, function, ADLs, as well as to allow patient to resume all school work without limitation.   [x] Progressing: [] Met: [] Not Met: [] Adjusted   6. Patient will be able to skate at least 1 hour at practice without increase in symptoms. (patient specific functional goal)    [x] Progressing: [] Met: [] Not Met: [] Adjusted    ASSESSMENT: Patient continues to make good improvements in overall cervical ROM, cervical DNF strength and activation and oculomotor exercises while working back into more sport specific exercises. Patient does still need cues for improved form and activation of DNF during more strenuous exercises. Struggles with all periscapular activation and needs tactile and verbal cues for improvement in overall form and decreased compensation in UT. Need to work into progressing jumping with oculomotor control. Additionally discussed further stretching for hips to assist with low back issues. Will continue to address deficits noted to allow pt to return to full sport on ice.       Treatment/Activity Tolerance:  [x] Patient tolerated treatment well [] Patient limited by fatique  [] Patient limited by pain  [] Patient limited by other medical complications  [] Other:     Overall Progression Towards Functional goals/ Treatment Progress Update:  [x] Patient is progressing as expected towards functional goals listed.    [] Progression is slowed due to complexities/Impairments listed.  [] Progression has been slowed due to co-morbidities.  [] Plan just implemented, too soon to assess goals progression <30days   [] Goals require adjustment due to lack of progress  [] Patient is not progressing as expected and requires additional follow up with physician  [] Other    Prognosis for POC: [x] Good [] Fair  [] Poor    Patient requires continued skilled intervention: [x] Yes  [] No        PLAN: Continue to

## 2024-05-02 ENCOUNTER — HOSPITAL ENCOUNTER (OUTPATIENT)
Dept: PHYSICAL THERAPY | Age: 24
Setting detail: THERAPIES SERIES
Discharge: HOME | End: 2024-05-02
Payer: COMMERCIAL

## 2024-05-02 PROCEDURE — 97140 MANUAL THERAPY 1/> REGIONS: CPT

## 2024-05-02 PROCEDURE — 97110 THERAPEUTIC EXERCISES: CPT

## 2024-05-02 PROCEDURE — 97112 NEUROMUSCULAR REEDUCATION: CPT

## 2024-05-02 NOTE — FLOWSHEET NOTE
physician  [] Other    Prognosis for POC: [x] Good [] Fair  [] Poor    Patient requires continued skilled intervention: [x] Yes  [] No        PLAN: Continue to address oculomotor and cervical symptoms  [x] Continue per plan of care [] Alter current plan (see comments)  [] Plan of care initiated [] Hold pending MD visit [] Discharge    Electronically signed by: Maria Eugenia Sher PT    Note: If patient does not return for scheduled/recommended follow up visits, this note will serve as a discharge from care along with the most recent update on progress.

## 2024-05-07 ENCOUNTER — HOSPITAL ENCOUNTER (OUTPATIENT)
Dept: PHYSICAL THERAPY | Age: 24
Setting detail: THERAPIES SERIES
Discharge: HOME | End: 2024-05-07
Payer: COMMERCIAL

## 2024-05-07 PROCEDURE — 97530 THERAPEUTIC ACTIVITIES: CPT

## 2024-05-07 PROCEDURE — 97140 MANUAL THERAPY 1/> REGIONS: CPT

## 2024-05-07 PROCEDURE — 97110 THERAPEUTIC EXERCISES: CPT

## 2024-05-07 NOTE — FLOWSHEET NOTE
an increase in postural awareness and control and activation of  Deep cervical stabilizers to allow for proper functional mobility as indicated by patients Functional Deficits.   [x] Progressing: [] Met: [] Not Met: [] Adjusted  4. Patient will return to working out and daily functional activities without increased symptoms or restriction.   [x] Progressing: [] Met: [] Not Met: [] Adjusted  5. Patient to have normalized concussion symptoms to facilitate participation in movement, function, ADLs, as well as to allow patient to resume all school work without limitation.   [x] Progressing: [] Met: [] Not Met: [] Adjusted   6. Patient will be able to skate at least 1 hour at practice without increase in symptoms. (patient specific functional goal)    [x] Progressing: [] Met: [] Not Met: [] Adjusted    ASSESSMENT: Patient continues to make good improvements in overall cervical ROM, cervical DNF strength and activation and oculomotor exercises while working back into more sport specific exercises. More tight in R cervical spine today, but with good improvement with manual therapy to R c/spine. Good tolerance but with quick fatigue with periscapular activation- needs continues cues to avoid over compensation of UT. Added further plyometrics to program with mild increase in symptoms of HA. Reduction in HA noted with suboccipital release and upper c/spine stretches.   Need to work into further progressing jumping with oculomotor control. Discussed progression as he returns back to on ice skating- opening 50-70% of speed, working on more sprints and 30-50% speed with stick handling. Will continue to address deficits noted to allow pt to return to full sport on ice.       Treatment/Activity Tolerance:  [x] Patient tolerated treatment well [] Patient limited by fatique  [] Patient limited by pain  [] Patient limited by other medical complications  [] Other:     Overall Progression Towards Functional goals/ Treatment Progress

## 2024-05-09 ENCOUNTER — HOSPITAL ENCOUNTER (OUTPATIENT)
Dept: PHYSICAL THERAPY | Age: 24
Setting detail: THERAPIES SERIES
Discharge: HOME | End: 2024-05-09
Payer: COMMERCIAL

## 2024-05-09 PROCEDURE — 97140 MANUAL THERAPY 1/> REGIONS: CPT

## 2024-05-09 NOTE — FLOWSHEET NOTE
[facility] [address]  Phone: [phone] Fax: [phone]            Physical Therapy Treatment Note/ Progress Report:       Date:  2024    Patient Name:  Rolando Torres   \"WILL\"  :  2000  MRN: [id number]  Restrictions/Precautions:    Medical/Treatment Diagnosis Information:  Diagnosis: concussion S06.0X0A, neck pain M54.2  Treatment Diagnosis: concussion S06.0X0A, neck pain M54.2  Insurance/Certification information:  PT Insurance Information: Aetna/[place]  Physician Information:  Lewis Skinner, *  Plan of care signed (Y/N):     Date of Patient follow up with Physician:      Progress Report: [x]  Yes  []  No     Date Range for reporting period:  Beginning: 3/19/2024  Endin2024    Progress report due (10 Rx/or 30 days whichever is less):     Recertification due (POC duration/ or 90 days whichever is less):      Visit # Insurance Allowable Auth Needed   25 Aetna/[place] []Yes    []No     Pain level:  3-10 (headache)     SUBJECTIVE: Pt states that he has more HA today as he has been doing a lot of studying and schoolwork for upcoming exams. Has been a lot of stress with exams as well.          OBJECTIVE:   Observation: mild rotation to L of C7/T1, small cyst/lipoma type nodule at this level on L  Increased tightness bilaterally in c2 and c3 today  Test measurements:      DHI 6% (10% prior)  NDI 4% (8% prior)    CERV ROM     Cervical Flexion 75   Cervical Extension 70   Cervical SB L 55; R 55    Cervical rotation L 93 ; R 90       RESTRICTIONS/PRECAUTIONS: n/a    INTERVENTIONS:      Therapeutic Ex 0 Sets/sec Reps Notes   UBE      Quadruped w cerv retract 1 10 green   Iron neck with hockey stick- slap shot- slow control 1 10    Iron neck retro step with turning- bilateral turns 1 10    Quadruped w/ cerv retraction + OH shoulder elevation 1 10 3 lb   Quadruped ball on wall + cervical retraction

## 2024-05-14 ENCOUNTER — APPOINTMENT (OUTPATIENT)
Dept: PHYSICAL THERAPY | Age: 24
End: 2024-05-14
Payer: COMMERCIAL

## 2024-05-16 ENCOUNTER — HOSPITAL ENCOUNTER (OUTPATIENT)
Dept: PHYSICAL THERAPY | Age: 24
Setting detail: THERAPIES SERIES
Discharge: HOME | End: 2024-05-16
Payer: COMMERCIAL

## 2024-05-16 PROCEDURE — 97140 MANUAL THERAPY 1/> REGIONS: CPT

## 2024-05-16 PROCEDURE — 97112 NEUROMUSCULAR REEDUCATION: CPT

## 2024-05-16 NOTE — PLAN OF CARE
[facility] [place] [address] [place] [address]  Phone: [phone] Fax: [phone]       Physical Therapy Discharge Summary    Dear Dr Skinner ,    We had the pleasure of treating the following patient for physical therapy services at [facility].  A summary of our findings can be found in the discharge summary below.  If you have any questions or concerns regarding these findings, please do not hesitate to contact me at the office phone number above.  Thank you for the referral.     Physician Signature:________________________________Date:__________________  By signing above (or electronic signature), therapist’s plan is approved by physician      Overall Response to Treatment:   []Patient is responding well to treatment and improvement is noted with regards  to goals   []Patient should continue to improve in reasonable time if they continue HEP   []Patient has plateaued and is no longer responding to skilled PT intervention    []Patient is getting worse and would benefit from return to referring MD   []Patient unable to adhere to initial POC   [x]Other: Patient will be returning home to [facility] and continuing with PT services there.    Date range of Visits: 26  Total Visits: 2024 thru 2024            Physical Therapy Treatment Note/ Progress Report:       Date:  2024    Patient Name:  Rolando Torres   \"WILL\"  :  2000  MRN: [id number]  Restrictions/Precautions:    Medical/Treatment Diagnosis Information:  Diagnosis: concussion S06.0X0A, neck pain M54.2  Treatment Diagnosis: concussion S06.0X0A, neck pain M54.2  Insurance/Certification information:  PT Insurance Information: Aetna/[place]  Physician Information:  Lewis Skinner, *  Plan of care signed (Y/N):     Date of Patient follow up with Physician:      Progress Report: [x]  Yes  []  No     Date Range for reporting period:  Beginning: